# Patient Record
(demographics unavailable — no encounter records)

---

## 2025-04-29 NOTE — HISTORY OF PRESENT ILLNESS
[FreeTextEntry1] : Dense breast with fibrocystic change Benign right breast mass  Patient denies any breast masses or nipple discharge.  Patient is now on bioidentical progesterone and testosterone.  Patient has no  family history of breast cancer

## 2025-04-29 NOTE — REASON FOR VISIT
[Follow-Up: _____] : a [unfilled] follow-up visit [FreeTextEntry1] : Annual screening [FreeTextEntry2] : H/O left breast mastectomy

## 2025-04-29 NOTE — PHYSICAL EXAM
[No Supraclavicular Adenopathy] : no supraclavicular adenopathy [No Cervical Adenopathy] : no cervical adenopathy [Examined in the supine and seated position] : examined in the supine and seated position [No dominant masses] : no dominant masses in right breast  [No dominant masses] : no dominant masses left breast [No Nipple Retraction] : no left nipple retraction [No Nipple Discharge] : no left nipple discharge [No Axillary Lymphadenopathy] : no left axillary lymphadenopathy [No Rashes] : no rashes